# Patient Record
Sex: FEMALE | Race: WHITE
[De-identification: names, ages, dates, MRNs, and addresses within clinical notes are randomized per-mention and may not be internally consistent; named-entity substitution may affect disease eponyms.]

---

## 2020-03-17 ENCOUNTER — HOSPITAL ENCOUNTER (EMERGENCY)
Dept: HOSPITAL 75 - ER | Age: 55
Discharge: HOME | End: 2020-03-17
Payer: COMMERCIAL

## 2020-03-17 VITALS — DIASTOLIC BLOOD PRESSURE: 74 MMHG | SYSTOLIC BLOOD PRESSURE: 128 MMHG

## 2020-03-17 VITALS — WEIGHT: 149.91 LBS | HEIGHT: 62.01 IN | BODY MASS INDEX: 27.24 KG/M2

## 2020-03-17 DIAGNOSIS — F17.210: ICD-10-CM

## 2020-03-17 DIAGNOSIS — N39.0: Primary | ICD-10-CM

## 2020-03-17 LAB
ALBUMIN SERPL-MCNC: 4.3 GM/DL (ref 3.2–4.5)
ALP SERPL-CCNC: 73 U/L (ref 40–136)
ALT SERPL-CCNC: 34 U/L (ref 0–55)
APTT PPP: YELLOW S
BACTERIA #/AREA URNS HPF: (no result) /HPF
BASOPHILS # BLD AUTO: 0 10^3/UL (ref 0–0.1)
BASOPHILS NFR BLD AUTO: 0 % (ref 0–10)
BILIRUB SERPL-MCNC: 0.5 MG/DL (ref 0.1–1)
BILIRUB UR QL STRIP: NEGATIVE
BUN/CREAT SERPL: 17
CALCIUM SERPL-MCNC: 9.1 MG/DL (ref 8.5–10.1)
CHLORIDE SERPL-SCNC: 108 MMOL/L (ref 98–107)
CO2 SERPL-SCNC: 20 MMOL/L (ref 21–32)
CREAT SERPL-MCNC: 0.77 MG/DL (ref 0.6–1.3)
EOSINOPHIL # BLD AUTO: 0 10^3/UL (ref 0–0.3)
EOSINOPHIL NFR BLD AUTO: 0 % (ref 0–10)
ERYTHROCYTE [DISTWIDTH] IN BLOOD BY AUTOMATED COUNT: 14.3 % (ref 10–14.5)
FIBRINOGEN PPP-MCNC: CLEAR MG/DL
GFR SERPLBLD BASED ON 1.73 SQ M-ARVRAT: > 60 ML/MIN
GLUCOSE SERPL-MCNC: 117 MG/DL (ref 70–105)
GLUCOSE UR STRIP-MCNC: NEGATIVE MG/DL
HCT VFR BLD CALC: 39 % (ref 35–52)
HGB BLD-MCNC: 13.4 G/DL (ref 11.5–16)
KETONES UR QL STRIP: NEGATIVE
LEUKOCYTE ESTERASE UR QL STRIP: (no result)
LIPASE SERPL-CCNC: 24 U/L (ref 8–78)
LYMPHOCYTES # BLD AUTO: 1.8 X 10^3 (ref 1–4)
LYMPHOCYTES NFR BLD AUTO: 12 % (ref 12–44)
MANUAL DIFFERENTIAL PERFORMED BLD QL: YES
MCH RBC QN AUTO: 30 PG (ref 25–34)
MCHC RBC AUTO-ENTMCNC: 35 G/DL (ref 32–36)
MCV RBC AUTO: 86 FL (ref 80–99)
MONOCYTES # BLD AUTO: 1.1 X 10^3 (ref 0–1)
MONOCYTES NFR BLD AUTO: 8 % (ref 0–12)
MONOCYTES NFR BLD: 9 %
NEUTROPHILS # BLD AUTO: 11.7 X 10^3 (ref 1.8–7.8)
NEUTROPHILS NFR BLD AUTO: 80 % (ref 42–75)
NEUTS BAND NFR BLD MANUAL: 84 %
NITRITE UR QL STRIP: POSITIVE
PH UR STRIP: 6 [PH] (ref 5–9)
PLATELET # BLD: 247 10^3/UL (ref 130–400)
PMV BLD AUTO: 10.9 FL (ref 7.4–10.4)
POTASSIUM SERPL-SCNC: 3.3 MMOL/L (ref 3.6–5)
PROT SERPL-MCNC: 7.4 GM/DL (ref 6.4–8.2)
PROT UR QL STRIP: (no result)
RBC #/AREA URNS HPF: (no result) /HPF
RBC MORPH BLD: NORMAL
SODIUM SERPL-SCNC: 140 MMOL/L (ref 135–145)
SP GR UR STRIP: 1.02 (ref 1.02–1.02)
SQUAMOUS #/AREA URNS HPF: (no result) /HPF
VARIANT LYMPHS NFR BLD MANUAL: 7 %
WBC # BLD AUTO: 14.7 10^3/UL (ref 4.3–11)
WBC #/AREA URNS HPF: (no result) /HPF

## 2020-03-17 PROCEDURE — 80053 COMPREHEN METABOLIC PANEL: CPT

## 2020-03-17 PROCEDURE — 83690 ASSAY OF LIPASE: CPT

## 2020-03-17 PROCEDURE — 83605 ASSAY OF LACTIC ACID: CPT

## 2020-03-17 PROCEDURE — 87804 INFLUENZA ASSAY W/OPTIC: CPT

## 2020-03-17 PROCEDURE — 71045 X-RAY EXAM CHEST 1 VIEW: CPT

## 2020-03-17 PROCEDURE — 87088 URINE BACTERIA CULTURE: CPT

## 2020-03-17 PROCEDURE — 87186 SC STD MICRODIL/AGAR DIL: CPT

## 2020-03-17 PROCEDURE — 85007 BL SMEAR W/DIFF WBC COUNT: CPT

## 2020-03-17 PROCEDURE — 81000 URINALYSIS NONAUTO W/SCOPE: CPT

## 2020-03-17 PROCEDURE — 36415 COLL VENOUS BLD VENIPUNCTURE: CPT

## 2020-03-17 PROCEDURE — 87040 BLOOD CULTURE FOR BACTERIA: CPT

## 2020-03-17 PROCEDURE — 85027 COMPLETE CBC AUTOMATED: CPT

## 2020-03-17 PROCEDURE — 87077 CULTURE AEROBIC IDENTIFY: CPT

## 2020-03-17 NOTE — DIAGNOSTIC IMAGING REPORT
EXAMINATION: Portable erect AP chest at 03:21 p.m.



INDICATION: Left-sided abdominal pain.



FINDINGS: There are no prior studies available for comparison.

This exam is less than optimal as the patient is still wearing a

bra.



The heart size is at the upper limits of normal. The central

pulmonary vascularity is somewhat prominent and there may be a

few Kerley B-lines in both lung bases. These findings do raise a

question of mild pulmonary congestion. Whether these findings are

chronic or acute is not certain. There is no evidence for

pneumonia or for a pleural effusion. The mediastinum is not

widened. The osseous structures are intact.



IMPRESSION:

1. There is borderline cardiomegaly and there may be an element

of mild pulmonary congestion present. Whether this is chronic or

acute however is not certain.

2. There is no acute abnormality identified otherwise.



Dictated by: 



  Dictated on workstation # [id number]

## 2020-03-17 NOTE — ED ABDOMINAL PAIN
General


Chief Complaint:  Abdominal/GI Problems


Stated Complaint:  FEVER;L SIDE PAIN


Nursing Triage Note:  


PT AMB TO TRIAGE WITH COMPLAINT OF LEFT SIDE ABD PAIN THAT RADIATES TO HER BACK.




STATES DOES HAVE DIFFICULTY URINATING AND BURNING. STATES ALSO HAS HAD COUGH, 


FEVER, AND SOA.


Sepsis Screen:  No Definite Risk


Source of Information:  Patient


Exam Limitations:  No Limitations





History of Present Illness


Date Seen by Provider:  Mar 17, 2020


Time Seen by Provider:  14:30


Initial Comments


55-year-old female who presents to the emergency room with complaints of left 

lower flank pain that radiates through to her back, painful urination with 

turning sensation. She also reports mild dry cough and fevers at home. Denies 

nausea, vomiting, diarrhea at this time but reports that she did have dry 

heaving this morning. Denies any recent travel.


Timing/Duration:  2-3 Days


Severity/Quality:  Sharp, Stabbing


Location:  LLQ, Flank





Allergies and Home Medications


Allergies


Coded Allergies:  


     No Known Drug Allergies (Unverified , 3/17/20)





Home Medications


Cefdinir 300 Mg Capsule, 300 MG PO BID


   Prescribed by: CORBY KANG on 3/17/20 1616


Ondansetron 4 Mg Tab.rapdis, 4 MG PO Q4H PRN for NAUSEA/VOMITING


   Prescribed by: CORBY KANG on 3/17/20 1616





Patient Home Medication List


Home Medication List Reviewed:  Yes





Review of Systems


Review of Systems


Constitutional:  see HPI, chills, fever


Respiratory:  See HPI, Cough


Gastrointestinal:  See HPI, Abdominal Pain


Genitourinary:  See HPI, Burning, Pain





All Other Systems Reviewed


Negative Unless Noted:  Yes





Past Medical-Social-Family Hx


Past Med/Social Hx:  Reviewed Nursing Past Med/Soc Hx


Patient Social History


Alcohol Use:  Denies Use


Recreational Drug Use:  No


Smoking Status:  Current Everyday Smoker


Type Used:  Cigarettes


Recent Foreign Travel:  No


Contact w/Someone Who Travel:  No


Recent Infectious Disease Expo:  No


Recent Hopitalizations:  No





Immunizations Up To Date


Tetanus Booster (TDap):  Unknown


PED Vaccines UTD:  Yes





Seasonal Allergies


Seasonal Allergies:  No





Past Medical History


Surgeries:  Yes


Hysterectomy


Respiratory:  No


Cardiac:  Yes


Hypertension


Neurological:  No


Genitourinary:  No


Gastrointestinal:  No


Musculoskeletal:  No


Endocrine:  No


HEENT:  No


Cancer:  No


Psychosocial:  No


Integumentary:  No


Blood Disorders:  No





Family Medical History


Reviewed Nursing Family Hx





Physical Exam


Vital Signs





Vital Signs - First Documented








 3/17/20





 13:53


 


Temp 39.4


 


Pulse 119


 


Resp 20


 


B/P (MAP) 139/74 (95)


 


Pulse Ox 94


 


O2 Delivery Room Air





Capillary Refill : Less Than 3 Seconds


Height/Weight/BMI


Height: '"


Weight: lbs. oz. kg; 27.00 BMI


Method:


General Appearance:  WD/WN, no apparent distress


Respiratory:  chest non-tender, lungs clear, normal breath sounds, no 

respiratory distress, no accessory muscle use


Cardiovascular:  normal peripheral pulses, regular rate, rhythm, no edema, no 

gallop, no JVD, no murmur


Gastrointestinal:  normal bowel sounds, non tender, soft, no organomegaly, no 

pulsatile mass


Extremities:  normal capillary refill


Back:  normal inspection, no CVA tenderness


Neurologic/Psychiatric:  alert, normal mood/affect, oriented x 3


Skin:  normal color, warm/dry





Focused Exam


Lactate Level


3/17/20 15:21: Lactic Acid Level 0.88





Lactic Acid Level





Laboratory Tests








Test


 3/17/20


15:21


 


Lactic Acid Level


 0.88 MMOL/L


(0.50-2.00)











Progress/Results/Core Measures


Results/Orders


Lab Results





Laboratory Tests








Test


 3/17/20


14:27 3/17/20


14:32 3/17/20


15:21 Range/Units


 


 


White Blood Count


 14.7 H


 


 


 4.3-11.0


10^3/uL


 


Red Blood Count


 4.51 


 


 


 4.35-5.85


10^6/uL


 


Hemoglobin 13.4    11.5-16.0  G/DL


 


Hematocrit 39    35-52  %


 


Mean Corpuscular Volume 86    80-99  FL


 


Mean Corpuscular Hemoglobin 30    25-34  PG


 


Mean Corpuscular Hemoglobin


Concent 35 


 


 


 32-36  G/DL





 


Red Cell Distribution Width 14.3    10.0-14.5  %


 


Platelet Count


 247 


 


 


 130-400


10^3/uL


 


Mean Platelet Volume 10.9 H   7.4-10.4  FL


 


Neutrophils (%) (Auto) 80 H   42-75  %


 


Lymphocytes (%) (Auto) 12    12-44  %


 


Monocytes (%) (Auto) 8    0-12  %


 


Eosinophils (%) (Auto) 0    0-10  %


 


Basophils (%) (Auto) 0    0-10  %


 


Neutrophils # (Auto) 11.7 H   1.8-7.8  X 10^3


 


Lymphocytes # (Auto) 1.8    1.0-4.0  X 10^3


 


Monocytes # (Auto) 1.1 H   0.0-1.0  X 10^3


 


Eosinophils # (Auto)


 0.0 


 


 


 0.0-0.3


10^3/uL


 


Basophils # (Auto)


 0.0 


 


 


 0.0-0.1


10^3/uL


 


Neutrophils % (Manual) 84     %


 


Lymphocytes % (Manual) 7     %


 


Monocytes % (Manual) 9     %


 


Blood Morphology Comment NORMAL     


 


Sodium Level 140    135-145  MMOL/L


 


Potassium Level 3.3 L   3.6-5.0  MMOL/L


 


Chloride Level 108 H     MMOL/L


 


Carbon Dioxide Level 20 L   21-32  MMOL/L


 


Anion Gap 12    5-14  MMOL/L


 


Blood Urea Nitrogen 13    7-18  MG/DL


 


Creatinine


 0.77 


 


 


 0.60-1.30


MG/DL


 


Estimat Glomerular Filtration


Rate > 60 


 


 


  





 


BUN/Creatinine Ratio 17     


 


Glucose Level 117 H     MG/DL


 


Calcium Level 9.1    8.5-10.1  MG/DL


 


Corrected Calcium 8.9    8.5-10.1  MG/DL


 


Total Bilirubin 0.5    0.1-1.0  MG/DL


 


Aspartate Amino Transf


(AST/SGOT) 24 


 


 


 5-34  U/L





 


Alanine Aminotransferase


(ALT/SGPT) 34 


 


 


 0-55  U/L





 


Alkaline Phosphatase 73      U/L


 


Total Protein 7.4    6.4-8.2  GM/DL


 


Albumin 4.3    3.2-4.5  GM/DL


 


Lipase 24    8-78  U/L


 


Urine Color  YELLOW    


 


Urine Clarity  CLEAR    


 


Urine pH  6.0   5-9  


 


Urine Specific Gravity  1.020   1.016-1.022  


 


Urine Protein  1+ H  NEGATIVE  


 


Urine Glucose (UA)  NEGATIVE   NEGATIVE  


 


Urine Ketones  NEGATIVE   NEGATIVE  


 


Urine Nitrite  POSITIVE H  NEGATIVE  


 


Urine Bilirubin  NEGATIVE   NEGATIVE  


 


Urine Urobilinogen  0.2   < = 1.0  MG/DL


 


Urine Leukocyte Esterase  2+ H  NEGATIVE  


 


Urine RBC (Auto)  2+ H  NEGATIVE  


 


Urine RBC  RARE    /HPF


 


Urine WBC   H   /HPF


 


Urine Squamous Epithelial


Cells 


 0-2 


 


  /HPF





 


Urine Crystals  NONE    /LPF


 


Urine Bacteria  MODERATE H   /HPF


 


Urine Casts  NONE    /LPF


 


Urine Mucus  SMALL H   /LPF


 


Urine Culture Indicated  YES    


 


Lactic Acid Level


 


 


 0.88 


 0.50-2.00


MMOL/L








Micro Results





Microbiology


3/17/20 Influenza Types A,B Antigen (INGRID) - Final, Complete


          





My Orders





Orders - CORBY KANG


Influenza A And B Antigens (3/17/20 14:16)


Comprehensive Metabolic Panel (3/17/20 14:16)


Lipase (3/17/20 14:16)


Ua Culture If Indicated (3/17/20 14:16)


Ed Iv/Invasive Line Start (3/17/20 14:16)


Cbc With Automated Diff (3/17/20 14:16)


Ns Iv 1000 Ml (Sodium Chloride 0.9%) (3/17/20 14:30)


Ibuprofen  Tablet (Motrin  Tablet) (3/17/20 14:30)


Manual Differential (3/17/20 14:27)


Urine Culture (3/17/20 14:32)


Chest 1 View, Ap/Pa Only (3/17/20 15:03)


Blood Culture (3/17/20 15:15)


Lactic Acid Analyzer (3/17/20 15:15)


Fentanyl  Injection (Sublimaze Injection (3/17/20 15:45)


Ceftriaxone  For Iv Use (Rocephin  For I (3/17/20 15:45)





Medications Given in ED





Current Medications








 Medications  Dose


 Ordered  Sig/Polo


 Route  Start Time


 Stop Time Status Last Admin


Dose Admin


 


 Ceftriaxone


 Sodium 1000 mg/


 Sterile Water  10 ml @ 


 200 mls/hr  ONCE  ONCE


 IV  3/17/20 15:45


 3/17/20 15:47 DC 3/17/20 15:51


200 MLS/HR


 


 Fentanyl Citrate  50 mcg  ONCE  ONCE


 IVP  3/17/20 15:45


 3/17/20 15:46 DC 3/17/20 15:51


50 MCG


 


 Ibuprofen  800 mg  ONCE  ONCE


 PO  3/17/20 14:30


 3/17/20 14:31 DC 3/17/20 14:33


800 MG








Vital Signs/I&O











 3/17/20 3/17/20 3/17/20





 13:53 14:33 16:52


 


Temp 39.4 39.4 36.6


 


Pulse 119  78


 


Resp 20  18


 


B/P (MAP) 139/74 (95)  128/74


 


Pulse Ox 94  100


 


O2 Delivery Room Air  Room Air














Blood Pressure Mean:                    95











Progress


Progress Note :  


   Time:  16:12


Progress Note


I have seen and evaluated the patient. I've informed her of her laboratory and 

imaging studies. She agrees with plan of care, plans for discharge, return 

precautions were given.





Departure


Impression





   Primary Impression:  


   Urinary tract infection


Disposition:  01 HOME, SELF-CARE


Condition:  Stable/Unchanged





Departure-Patient Inst.


Decision time for Depature:  16:12


Referrals:  


[facility]


Patient Instructions:  Urinary Tract Infection, Adult (DC)





Add. Discharge Instructions:  


Take antibiotics as directed. Drink plenty of fluids to stay hydrated and to 

help flush out your kidneys and urinary tract. Call today to schedule follow-up 

appointment time with Avis Mora at the [facility]. Try to be

seen later this week or early next week. Return back to the emergency room for 

worsening symptoms or concerns as needed.





All discharge instructions reviewed with patient and/or family. Voiced 

understanding.


Scripts


Ondansetron (Ondansetron Odt) 4 Mg Tab.rapdis


4 MG PO Q4H PRN for NAUSEA/VOMITING for 7 Days, #30 TAB


   Prov: CORBY KANG         3/17/20 


Cefdinir (Cefdinir) 300 Mg Capsule


300 MG PO BID for 7 Days, #14 CAP 0 Refills


   Prov: CORBY KANG         3/17/20











CORBY KANG                  Mar 17, 2020 14:39

## 2020-03-17 NOTE — XMS REPORT
Continuity of Care Document

                             Created on: 2020



Carina Adame

External Reference #: [id number]

: 1965

Sex: Female



Demographics





                          Address                   [address] [place]  [address]

 

                          Home Phone                [phone] x

 

                          Preferred Language        Unknown

 

                          Marital Status            Unknown

 

                          Anabaptist Affiliation     Unknown

 

                          Race                      Unknown

 

                          Ethnic Group              Unknown





Author





                          Organization              Unknown

 

                          Address                   Unknown

 

                          Phone                     Unavailable



              



Allergies

      



There is no data.                  



Medications

      



There is no data.                  



Problems

      



There is no data.                  



Procedures

      



There is no data.                  



Results

      



                    Test                Result              Range        

 

                                        AMYLASE - 10/21/19 14:44         

 

                    AMYLASE             44 U/L                      



                



Encounters

      



                ACCT No.           Visit Date/Time           Discharge          

 Status         

             Pt. Type           Provider           Facility           Loc./Unit 

          

Complaint        

 

                [id number]           10/21/2019 13:00:00           10/21/2019 23:59:

60           [facility]

                Outpatient           [facility] [address] 

[facility]                                

 

             [id number]           10/21/2019 13:00:00                              

       Document

Registration                                                                    

 

             [id number]           2020 13:50:00                        A

CT           

Emergency                 CORBY KANG            [facility]   

                          ER                        FEVER;L SIDE PAIN

## 2023-09-06 ENCOUNTER — HOSPITAL ENCOUNTER (OUTPATIENT)
Dept: HOSPITAL 75 - PREOP | Age: 58
Discharge: HOME | End: 2023-09-06
Attending: SURGERY
Payer: COMMERCIAL

## 2023-09-06 VITALS — WEIGHT: 138.23 LBS | HEIGHT: 62.99 IN | BODY MASS INDEX: 24.49 KG/M2

## 2023-09-06 DIAGNOSIS — Z01.818: Primary | ICD-10-CM

## 2023-09-13 ENCOUNTER — HOSPITAL ENCOUNTER (OUTPATIENT)
Dept: HOSPITAL 75 - SDC | Age: 58
Discharge: HOME | End: 2023-09-13
Attending: SURGERY
Payer: COMMERCIAL

## 2023-09-13 VITALS — SYSTOLIC BLOOD PRESSURE: 161 MMHG | DIASTOLIC BLOOD PRESSURE: 70 MMHG

## 2023-09-13 VITALS — SYSTOLIC BLOOD PRESSURE: 145 MMHG | DIASTOLIC BLOOD PRESSURE: 82 MMHG

## 2023-09-13 VITALS — SYSTOLIC BLOOD PRESSURE: 150 MMHG | DIASTOLIC BLOOD PRESSURE: 82 MMHG

## 2023-09-13 VITALS — BODY MASS INDEX: 24.49 KG/M2 | WEIGHT: 138.23 LBS | HEIGHT: 62.99 IN

## 2023-09-13 VITALS — SYSTOLIC BLOOD PRESSURE: 141 MMHG | DIASTOLIC BLOOD PRESSURE: 74 MMHG

## 2023-09-13 VITALS — DIASTOLIC BLOOD PRESSURE: 82 MMHG | SYSTOLIC BLOOD PRESSURE: 145 MMHG

## 2023-09-13 VITALS — SYSTOLIC BLOOD PRESSURE: 160 MMHG | DIASTOLIC BLOOD PRESSURE: 88 MMHG

## 2023-09-13 VITALS — DIASTOLIC BLOOD PRESSURE: 74 MMHG | SYSTOLIC BLOOD PRESSURE: 145 MMHG

## 2023-09-13 VITALS — DIASTOLIC BLOOD PRESSURE: 86 MMHG | SYSTOLIC BLOOD PRESSURE: 150 MMHG

## 2023-09-13 VITALS — DIASTOLIC BLOOD PRESSURE: 90 MMHG | SYSTOLIC BLOOD PRESSURE: 129 MMHG

## 2023-09-13 VITALS — DIASTOLIC BLOOD PRESSURE: 78 MMHG | SYSTOLIC BLOOD PRESSURE: 120 MMHG

## 2023-09-13 VITALS — DIASTOLIC BLOOD PRESSURE: 87 MMHG | SYSTOLIC BLOOD PRESSURE: 146 MMHG

## 2023-09-13 VITALS — DIASTOLIC BLOOD PRESSURE: 88 MMHG | SYSTOLIC BLOOD PRESSURE: 160 MMHG

## 2023-09-13 VITALS — SYSTOLIC BLOOD PRESSURE: 136 MMHG | DIASTOLIC BLOOD PRESSURE: 76 MMHG

## 2023-09-13 DIAGNOSIS — F17.210: ICD-10-CM

## 2023-09-13 DIAGNOSIS — K43.0: Primary | ICD-10-CM

## 2023-09-13 DIAGNOSIS — K42.0: ICD-10-CM

## 2023-09-13 PROCEDURE — 49594 RPR AA HRN 1ST 3-10 NCR/STRN: CPT

## 2023-09-13 PROCEDURE — 94664 DEMO&/EVAL PT USE INHALER: CPT

## 2023-09-13 PROCEDURE — 87081 CULTURE SCREEN ONLY: CPT

## 2023-09-13 RX ADMIN — LIDOCAINE HYDROCHLORIDE ONE ML: 10; .005 INJECTION, SOLUTION EPIDURAL; INFILTRATION; INTRACAUDAL; PERINEURAL at 11:49

## 2023-09-13 RX ADMIN — SODIUM CHLORIDE, SODIUM LACTATE, POTASSIUM CHLORIDE, AND CALCIUM CHLORIDE PRN MLS/HR: 600; 310; 30; 20 INJECTION, SOLUTION INTRAVENOUS at 09:57

## 2023-09-13 RX ADMIN — SODIUM CHLORIDE, SODIUM LACTATE, POTASSIUM CHLORIDE, AND CALCIUM CHLORIDE PRN MLS/HR: 600; 310; 30; 20 INJECTION, SOLUTION INTRAVENOUS at 12:10

## 2023-09-13 NOTE — PROGRESS NOTE-POST OPERATIVE
Post-Operative Progess Note


Surgeon (s)/Assistant (s)


Surgeon


NNEKA NARANJO DO


Assistant:  Terry





Pre-Operative Diagnosis


Incarcerated Ventral/incisional hernia, possible umbilical hernia





Post-Operative Diagnosis





Same, found two hernias





Procedure & Operative Findings


Date of Procedure


9/13/23


Procedure Performed/Findings


Laparoscopic Ventral and Umbilical herniarraphy,  by bridge and 

measured appx 6cm with Robot











COMPLICATIONS: None. 


 


INDICATIONS:


The patient is a 58, female with an incarcerated ventral and umbilical hernia, 

which


has continued to increase in size and cause discomfort. The patient was 

explained the 


risk and benefits of the procedure and wished to proceed with the procedure. Co

nsent 


was signed on the chart. 


 


DESCRIPTION OF PROCEDURE:


The patient was taken into the operating suite, prepped and draped in sterile 

fashion.  


Surgical pause was performed.  Local anesthetic was infiltrated in left upper 

quadrant.  


A #11 blade scalpel was used to make a small skin incision. Cautery was used to 

dissect 


down to the fascia, which was then scored and divided the muscle, went through 

the 


posterior sheath and a balloon trocar was inserted into the abdomen.  The 

abdomen was 


then insufflated. Saw what looked like an umbilical hernia and another ventral 

hernia;


incarcerated. An 8 mm robotic trocar was placed approximately 10cm below the LUQ

port and


the another in the left lower quadrant. Then used bovie cautery on the scissors 

and the 


bipolar to take down the falciform and pull fat out of both hernias.  Once all 

the fat 


was out, took a picture of the two defects and the bridge. The total length 

measured 


approximately 6cm total.  An Echo Ventralight 4 x 6 inch mesh was then inserted 

in the 


abdomen grabbed through a stab incision with the Victoriano. The balloon 

was inflated 


on the mesh. A 5mm Versastep port was placed at about umbilicus height on the 

right side


to assist in tacking.  Circumferential tacks were placed with a SecureStrap 

Tacker.  The 


balloon was then removed and inner crown was created as well. The mesh was 

tacked with 


pressure being decreased. The 12 mm fascial defect was then closed using 0 

Vicryl. The 


abdomen was then desufflated,the trocars were removed. The skin was then closed 

using 4-0 


Monocryl in a subcuticular fashion. The abdomen was washed and dried and Skin 

Affix


was placed over the incisions.  The patient tolerated procedure well without any


complications.  She was taken to recovery room in stable condition.





Dr. Stewart assisted on this case helping to make incisions, close incisions, 

identify anatomy


and then to tack the mesh in place.


Anesthesia Type


GET





Estimated Blood Loss


Estimated blood loss (mL):  scant





Specimens/Packing


Specimens Removed


none











NNEKA NARANJO DO               Sep 13, 2023 12:59

## 2023-09-13 NOTE — PROGRESS NOTE-PRE OPERATIVE
Pre-Operative Progress Note


Date of Available H&P:  Aug 31, 2023


Date H&P Reviewed:  Sep 13, 2023


Time H&P Reviewed:  10:56


History & Physical:  H&P Reviewed, Patient Examed, No changes noted


Pre-Operative Diagnosis:  Incarcerated Ventral/incisional hernia, possible 

umbilical hernia











NNEKA NARANJO DO               Sep 13, 2023 10:58

## 2023-09-13 NOTE — DISCHARGE INST-SURGICAL
Discharge Inst-Surgical


Depart Medication/Instructions


New, Converted or Re-Newed RX:  Transmitted to Pharmacy


Patient Instructions


Follow up Appt:


Make appointment for 1 week. [phone]





Instructions:


No lifting greater than 20 pounds.


No strenuous activity. 


May shower in 24 hours, no tub bath or soaking.


Use incentive spirometer at home as directed.


No Smoking





Skin/Wound Care:


May remove bandages in am.  You need to leave the Dermabond on incision it will 

fall off on it's own. 





Symptoms to Report:


Appetite Changes, Extremity Discoloration, Numbness/Tingling, Swelling 

Increased, Bleeding Excessive, Eyesight Changes, Pain Increased, Urine Color 

Change, Constipation(Persistent), Fever over 101 degree F, Pain/Pressure in 

chest, Urinating Difficulty, Cough Up/Vomit Blood, Heart Beat Irreg/Pounding, 

Pain/Pressure in jaw, Cramps in feet or legs, Lightheadedness, Pain/Pressure in 

shoulder, Diarrhea(Persistent), Memory Changes Suddenly, Questions/Concerns, 

Weight gain consecutive days, Dizziness/Fainting, Nausea/Vomiting, Shortness of 

Breath, Weight gain over 2 pounds








If questions or concerns contact your physician 


Or seek help at emergency department.





Activity


Activity as Tolerated:  Yes


Activity Instructions:  Avoid Stress to Incision


Driving Instructions:  No Driving/Refer to Dr. Cavanaugh


Discharge Diet:  No Restrictions


Diet After 24 Hours:  Clear Liquid if Nauseous


If Any Problems/Questions/Issu:  Contact Your Physician, Go to Emergency Room





Skin/Wound Care


Infection Signs and Symptoms:  Increased Redness, Foul Odor of Wound, Increased 

Drainage, Skin Itchy or Has a Rash, Increased Swelling, Temperature Above 101  F


Wound Care Comment:  


heat to shoulder or neck tonight for pain


Bathing Instructions:  Shower


Stitches/Staples/Dermabond Dis:  Rosmeryond











NNEKA NARANJO DO               Sep 13, 2023 13:01

## 2023-09-13 NOTE — ANESTHESIA-GENERAL POST-OP
General


Patient Condition


Mental Status/LOC:  Same as Preop


Cardiovascular:  Satisfactory


Nausea/Vomiting:  Absent


Respiratory:  Satisfactory


Pain:  Controlled


Complications:  Absent





Post Op Complications


Complications


None





Follow Up Care/Instructions


Patient Instructions


None needed.





Anesthesia/Patient Condition


Patient Condition


Patient is doing well, no complaints, stable vital signs, no apparent adverse 

anesthesia problems.   


No complications reported per nursing.











MICAELA SURESH DO         Sep 13, 2023 14:26